# Patient Record
Sex: FEMALE | Race: OTHER | HISPANIC OR LATINO | Employment: UNEMPLOYED | URBAN - METROPOLITAN AREA
[De-identification: names, ages, dates, MRNs, and addresses within clinical notes are randomized per-mention and may not be internally consistent; named-entity substitution may affect disease eponyms.]

---
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
## 2020-03-14 ENCOUNTER — HOSPITAL ENCOUNTER (EMERGENCY)
Facility: HOSPITAL | Age: 20
Discharge: HOME | End: 2020-03-14
Attending: EMERGENCY MEDICINE
Payer: MEDICAID

## 2020-03-14 VITALS
RESPIRATION RATE: 20 BRPM | SYSTOLIC BLOOD PRESSURE: 109 MMHG | DIASTOLIC BLOOD PRESSURE: 55 MMHG | OXYGEN SATURATION: 100 % | TEMPERATURE: 98 F | HEIGHT: 62 IN | HEART RATE: 81 BPM | BODY MASS INDEX: 18.95 KG/M2 | WEIGHT: 103 LBS

## 2020-03-14 DIAGNOSIS — Z33.1 INCIDENTAL PREGNANCY: Primary | ICD-10-CM

## 2020-03-14 DIAGNOSIS — R10.9 ABDOMINAL PAIN DURING PREGNANCY IN SECOND TRIMESTER: ICD-10-CM

## 2020-03-14 DIAGNOSIS — O26.892 ABDOMINAL PAIN DURING PREGNANCY IN SECOND TRIMESTER: ICD-10-CM

## 2020-03-14 LAB
B-HCG UR QL: POSITIVE
BACTERIA #/AREA URNS HPF: ABNORMAL /HPF
BACTERIA GENITAL QL WET PREP: ABNORMAL
BILIRUB UR QL STRIP: NEGATIVE
CLARITY UR: CLEAR
CLUE CELLS VAG QL WET PREP: ABNORMAL
COLOR UR: YELLOW
CTP QC/QA: YES
FILAMENT FUNGI VAG WET PREP-#/AREA: ABNORMAL
GLUCOSE UR QL STRIP: NEGATIVE
HCG INTACT+B SERPL-ACNC: NORMAL MIU/ML
HGB UR QL STRIP: NEGATIVE
KETONES UR QL STRIP: NEGATIVE
LEUKOCYTE ESTERASE UR QL STRIP: ABNORMAL
MICROSCOPIC COMMENT: ABNORMAL
NITRITE UR QL STRIP: NEGATIVE
PH UR STRIP: 6 [PH] (ref 5–8)
PROT UR QL STRIP: NEGATIVE
RBC #/AREA URNS HPF: 5 /HPF (ref 0–4)
SP GR UR STRIP: 1.02 (ref 1–1.03)
SPECIMEN SOURCE: ABNORMAL
SQUAMOUS #/AREA URNS HPF: 8 /HPF
T VAGINALIS GENITAL QL WET PREP: ABNORMAL
URN SPEC COLLECT METH UR: ABNORMAL
UROBILINOGEN UR STRIP-ACNC: ABNORMAL EU/DL
WBC #/AREA URNS HPF: 3 /HPF (ref 0–5)
WBC #/AREA VAG WET PREP: ABNORMAL
YEAST GENITAL QL WET PREP: ABNORMAL

## 2020-03-14 PROCEDURE — 84702 CHORIONIC GONADOTROPIN TEST: CPT

## 2020-03-14 PROCEDURE — 99284 EMERGENCY DEPT VISIT MOD MDM: CPT

## 2020-03-14 PROCEDURE — 87491 CHLMYD TRACH DNA AMP PROBE: CPT

## 2020-03-14 PROCEDURE — 81000 URINALYSIS NONAUTO W/SCOPE: CPT

## 2020-03-14 PROCEDURE — 81025 URINE PREGNANCY TEST: CPT | Performed by: PHYSICIAN ASSISTANT

## 2020-03-14 PROCEDURE — 87210 SMEAR WET MOUNT SALINE/INK: CPT

## 2020-03-14 RX ORDER — DEXTROMETHORPHAN HYDROBROMIDE, GUAIFENESIN 5; 100 MG/5ML; MG/5ML
650 LIQUID ORAL 3 TIMES DAILY PRN
Qty: 20 TABLET | Refills: 0 | Status: SHIPPED | OUTPATIENT
Start: 2020-03-14

## 2020-03-15 NOTE — ED TRIAGE NOTES
Pt is here with family with mother and reports abdominal pain that started 3 days ago. Pt also reports being pregnant, and not know how far long she is.

## 2020-03-15 NOTE — DISCHARGE INSTRUCTIONS
Tylenol as needed for pain.  Begin taking prenatals daily.  Follow-up and establish care with an OBGYN for re-evaluation and further recommendations.  Please return to this ED if your pain worsens despite treatment, if you begin with vaginal bleeding, if any urinary problems, if you begin with fever, if you begin with signs of an upper respiratory infection, if any other problems occur.    Tylenol según sea necesario para el dolor. Comience a avery prenatales diariamente. Vahid un seguimiento y establezca la atención con un OBGYN para la reevaluación y otras recomendaciones. Regrese a michael servicio de urgencias si quintero dolor empeora a pesar del tratamiento, si comienza con sangrado vaginal, si tiene problemas urinarios, si comienza con fiebre, si comienza con signos de chandler infección de las vías respiratorias superiores, si ocurre algún otro problema.

## 2020-03-15 NOTE — ED PROVIDER NOTES
Encounter Date: 3/14/2020       History     Chief Complaint   Patient presents with    Abdominal Pain     Lower abdominal pain that started on yesterday. Denies n/v. Reports a +home pregnancy test x 3days ago. LMP Nov 12,2019.     20yo F here with mom. Mom states pt with ADD, some sort of autism spectrum disorder as well. Mom is doing most of the talking for the patient. Pt presents today with acute onset suprapubic pain x today. No vaginal bleeding, spotting, discharge. No dysuria, hematuria, increased urinary frequency, strong odor to the urine, or flank pain.    No nausea or vomiting.  No fever.  No trauma. No change in bowel habits. No change in appetite or PO intake. No sick contacts or recent illness.    +home UPT 3 days ago. No previous pregnancy.         Review of patient's allergies indicates:  No Known Allergies  History reviewed. No pertinent past medical history.  History reviewed. No pertinent surgical history.  History reviewed. No pertinent family history.  Social History     Tobacco Use    Smoking status: Never Smoker    Smokeless tobacco: Never Used   Substance Use Topics    Alcohol use: Never     Frequency: Never    Drug use: Never     Review of Systems   Constitutional: Negative for appetite change, chills and fever.   Respiratory: Negative for cough and shortness of breath.    Cardiovascular: Negative for chest pain.   Gastrointestinal: Positive for abdominal pain. Negative for anal bleeding, blood in stool, constipation, diarrhea, nausea and vomiting.   Genitourinary: Negative for dysuria, flank pain, frequency, hematuria, pelvic pain, vaginal bleeding, vaginal discharge and vaginal pain.   Musculoskeletal: Negative for arthralgias, myalgias, neck pain and neck stiffness.   Skin: Negative for rash.   Neurological: Negative for dizziness, light-headedness and headaches.       Physical Exam     Initial Vitals [03/14/20 1949]   BP Pulse Resp Temp SpO2   (!) 106/58 88 20 98.8 °F (37.1 °C) 100 %       MAP       --         Physical Exam    Nursing note and vitals reviewed.  Constitutional: She appears well-developed and well-nourished. She is not diaphoretic. No distress.   HENT:   Head: Normocephalic and atraumatic.   Eyes: EOM are normal.   Neck: Neck supple.   Cardiovascular: Intact distal pulses.   Abdominal:   Abdomen overall soft, normal bowel sounds ×4. Mild ttp suprapubic region. Pt with gravid abdomen; bedside u/s with SLIUP, +fetal movement, +fetal heartbeat, uterine fundus just above umbilicus. No rebound or guarding. No flank or CVA tenderness.  Negative Gonzalez sign.  No pain over McBurney's point.   Genitourinary:   Genitourinary Comments: No vesicular lesions or open sores to external genitalia. Mild amount thin, yellow/white d/c within vagina. No gross bleeding, tissue within vaginal vault. No adnexal mass or ttp. Cervix os closed. Normal cervix transition zone without erythema/friability. No CMT. No significant bleeding, tissue, discharge from os.   Neurological: She is alert and oriented to person, place, and time.   Skin: Skin is warm and dry.   Psychiatric: She has a normal mood and affect. Thought content normal.         ED Course   Procedures  Labs Reviewed   URINALYSIS, REFLEX TO URINE CULTURE - Abnormal; Notable for the following components:       Result Value    Urobilinogen, UA 2.0-3.0 (*)     Leukocytes, UA Trace (*)     All other components within normal limits    Narrative:     Preferred Collection Type->Urine, Clean Catch   URINALYSIS MICROSCOPIC - Abnormal; Notable for the following components:    RBC, UA 5 (*)     All other components within normal limits    Narrative:     Preferred Collection Type->Urine, Clean Catch   VAGINAL SCREEN - Abnormal; Notable for the following components:    WBC - Vaginal Screen Rare (*)     Bacteria - Vaginal Screen Rare (*)     All other components within normal limits   POCT URINE PREGNANCY - Abnormal; Notable for the following components:    POC  Preg Test, Ur Positive (*)     All other components within normal limits   C. TRACHOMATIS/N. GONORRHOEAE BY AMP DNA   HCG, QUANTITATIVE, PREGNANCY          Imaging Results    None          Medical Decision Making:   Differential Diagnosis:   Abdominal pain in pregnancy, UTI, STI, ectopic  Clinical Tests:   Lab Tests: Ordered and Reviewed  Radiological Study: Reviewed  ED Management:  SLIUP. No ectopic, no hx ectopic, no hx abdominal surgeries. Pt is answering questions appropriately, mom is answering for pt as well. Pt appears stated age, she is acting normally for her age. There are no signs of physical abuse or trauma. She is well-appearing and nontoxic. Urine is without infection or bacteriuria. Vaginal screen wnl.  They have not established OB as of yet. Will refer to OB. Do not suspect emergent process. No URI complaints. Return precautions given.                                 Clinical Impression:       ICD-10-CM ICD-9-CM   1. Incidental pregnancy Z33.1 V22.2   2. Abdominal pain during pregnancy in second trimester O26.892 646.83    R10.9 789.00         Disposition:   Disposition: Discharged  Condition: Stable     ED Disposition Condition    Discharge Stable        ED Prescriptions     Medication Sig Dispense Start Date End Date Auth. Provider    acetaminophen (TYLENOL) 650 MG TbSR Take 1 tablet (650 mg total) by mouth 3 (three) times daily as needed (pain). 20 tablet 3/14/2020  Chandrakant Walsh PA-C    prenatal 21-iron fu-folic acid (PRENATAL COMPLETE) 14 mg iron- 400 mcg Tab Take 1 tablet by mouth once daily. 30 tablet 3/14/2020 3/14/2021 Chandrakant Walsh PA-C        Follow-up Information     Follow up With Specialties Details Why Contact Info    [facility]  Schedule an appointment as soon as possible for a visit  To establish obstetric care [address] [place] [id number] [phone]    [facility] Emergency [facility]  As needed, If symptoms  [address] [facility] [address]  [phone]                                     Chandrakant Walsh PA-C  03/14/20 2141

## 2020-03-16 LAB
C TRACH DNA SPEC QL NAA+PROBE: NOT DETECTED
N GONORRHOEA DNA SPEC QL NAA+PROBE: NOT DETECTED